# Patient Record
Sex: FEMALE | Race: WHITE | Employment: FULL TIME | URBAN - METROPOLITAN AREA
[De-identification: names, ages, dates, MRNs, and addresses within clinical notes are randomized per-mention and may not be internally consistent; named-entity substitution may affect disease eponyms.]

---

## 2023-05-09 LAB
C. TRACHOMATIS, EXTERNAL RESULT: NEGATIVE
N. GONORRHOEAE, EXTERNAL RESULT: NEGATIVE

## 2023-05-25 LAB — RH FACTOR, EXTERNAL RESULT: NORMAL

## 2023-05-26 LAB
ABO, EXTERNAL RESULT: NORMAL
HEP B, EXTERNAL RESULT: NEGATIVE
HEPATITIS C ANTIBODY, EXTERNAL RESULT: NEGATIVE
HIV, EXTERNAL RESULT: NEGATIVE
RH FACTOR, EXTERNAL RESULT: NEGATIVE
RPR, EXTERNAL RESULT: NON REACTIVE
RUBELLA TITER, EXTERNAL RESULT: NORMAL

## 2023-12-12 LAB — GBS, EXTERNAL RESULT: NEGATIVE

## 2024-01-02 ENCOUNTER — HOSPITAL ENCOUNTER (INPATIENT)
Facility: HOSPITAL | Age: 29
LOS: 3 days | Discharge: HOME | End: 2024-01-05
Attending: OBSTETRICS & GYNECOLOGY | Admitting: OBSTETRICS & GYNECOLOGY
Payer: COMMERCIAL

## 2024-01-02 PROBLEM — Z34.90 TERM PREGNANCY: Status: ACTIVE | Noted: 2024-01-02

## 2024-01-02 LAB
BASOPHILS # BLD: 0 K/UL (ref 0–0.1)
BASOPHILS NFR BLD: 0 % (ref 0–1)
DIFFERENTIAL METHOD BLD: ABNORMAL
EOSINOPHIL # BLD: 0.1 K/UL (ref 0–0.4)
EOSINOPHIL NFR BLD: 1 % (ref 0–7)
ERYTHROCYTE [DISTWIDTH] IN BLOOD BY AUTOMATED COUNT: 12.9 % (ref 11.5–14.5)
HCT VFR BLD AUTO: 33.2 % (ref 35–47)
HGB BLD-MCNC: 11.5 G/DL (ref 11.5–16)
IMM GRANULOCYTES # BLD AUTO: 0 K/UL (ref 0–0.04)
IMM GRANULOCYTES NFR BLD AUTO: 0 % (ref 0–0.5)
LYMPHOCYTES # BLD: 1.4 K/UL (ref 0.8–3.5)
LYMPHOCYTES NFR BLD: 14 % (ref 12–49)
MCH RBC QN AUTO: 28.5 PG (ref 26–34)
MCHC RBC AUTO-ENTMCNC: 34.6 G/DL (ref 30–36.5)
MCV RBC AUTO: 82.4 FL (ref 80–99)
MONOCYTES # BLD: 0.6 K/UL (ref 0–1)
MONOCYTES NFR BLD: 6 % (ref 5–13)
NEUTS SEG # BLD: 8 K/UL (ref 1.8–8)
NEUTS SEG NFR BLD: 79 % (ref 32–75)
NRBC # BLD: 0 K/UL (ref 0–0.01)
NRBC BLD-RTO: 0 PER 100 WBC
PLATELET # BLD AUTO: 365 K/UL (ref 150–400)
PMV BLD AUTO: 10.8 FL (ref 8.9–12.9)
RBC # BLD AUTO: 4.03 M/UL (ref 3.8–5.2)
WBC # BLD AUTO: 10.1 K/UL (ref 3.6–11)

## 2024-01-02 PROCEDURE — 1100000000 HC RM PRIVATE

## 2024-01-02 PROCEDURE — 86901 BLOOD TYPING SEROLOGIC RH(D): CPT

## 2024-01-02 PROCEDURE — 86850 RBC ANTIBODY SCREEN: CPT

## 2024-01-02 PROCEDURE — 85025 COMPLETE CBC W/AUTO DIFF WBC: CPT

## 2024-01-02 PROCEDURE — 7210000100 HC LABOR FEE PER 1 HR: Performed by: OBSTETRICS & GYNECOLOGY

## 2024-01-02 PROCEDURE — 86900 BLOOD TYPING SEROLOGIC ABO: CPT

## 2024-01-02 PROCEDURE — 36415 COLL VENOUS BLD VENIPUNCTURE: CPT

## 2024-01-02 PROCEDURE — 86780 TREPONEMA PALLIDUM: CPT

## 2024-01-02 RX ORDER — MISOPROSTOL 200 UG/1
800 TABLET ORAL PRN
Status: DISCONTINUED | OUTPATIENT
Start: 2024-01-02 | End: 2024-01-03

## 2024-01-02 RX ORDER — DOCUSATE SODIUM 100 MG/1
100 CAPSULE, LIQUID FILLED ORAL 2 TIMES DAILY
Status: DISCONTINUED | OUTPATIENT
Start: 2024-01-02 | End: 2024-01-03

## 2024-01-02 RX ORDER — TRANEXAMIC ACID 10 MG/ML
1000 INJECTION, SOLUTION INTRAVENOUS
Status: DISCONTINUED | OUTPATIENT
Start: 2024-01-02 | End: 2024-01-03

## 2024-01-02 RX ORDER — SODIUM CHLORIDE 0.9 % (FLUSH) 0.9 %
5-40 SYRINGE (ML) INJECTION PRN
Status: DISCONTINUED | OUTPATIENT
Start: 2024-01-02 | End: 2024-01-03

## 2024-01-02 RX ORDER — CARBOPROST TROMETHAMINE 250 UG/ML
250 INJECTION, SOLUTION INTRAMUSCULAR PRN
Status: DISCONTINUED | OUTPATIENT
Start: 2024-01-02 | End: 2024-01-03

## 2024-01-02 RX ORDER — METHYLERGONOVINE MALEATE 0.2 MG/ML
200 INJECTION INTRAVENOUS PRN
Status: DISCONTINUED | OUTPATIENT
Start: 2024-01-02 | End: 2024-01-03

## 2024-01-02 RX ORDER — SODIUM CHLORIDE, SODIUM LACTATE, POTASSIUM CHLORIDE, AND CALCIUM CHLORIDE .6; .31; .03; .02 G/100ML; G/100ML; G/100ML; G/100ML
500 INJECTION, SOLUTION INTRAVENOUS PRN
Status: DISCONTINUED | OUTPATIENT
Start: 2024-01-02 | End: 2024-01-03

## 2024-01-02 RX ORDER — SODIUM CHLORIDE, SODIUM LACTATE, POTASSIUM CHLORIDE, AND CALCIUM CHLORIDE .6; .31; .03; .02 G/100ML; G/100ML; G/100ML; G/100ML
1000 INJECTION, SOLUTION INTRAVENOUS PRN
Status: DISCONTINUED | OUTPATIENT
Start: 2024-01-02 | End: 2024-01-03

## 2024-01-02 RX ORDER — SODIUM CHLORIDE 0.9 % (FLUSH) 0.9 %
5-40 SYRINGE (ML) INJECTION EVERY 12 HOURS SCHEDULED
Status: DISCONTINUED | OUTPATIENT
Start: 2024-01-02 | End: 2024-01-03

## 2024-01-02 RX ORDER — SODIUM CHLORIDE 9 MG/ML
25 INJECTION, SOLUTION INTRAVENOUS PRN
Status: DISCONTINUED | OUTPATIENT
Start: 2024-01-02 | End: 2024-01-03

## 2024-01-02 RX ORDER — ACETAMINOPHEN 325 MG/1
650 TABLET ORAL EVERY 4 HOURS PRN
Status: DISCONTINUED | OUTPATIENT
Start: 2024-01-02 | End: 2024-01-03

## 2024-01-02 RX ORDER — SODIUM CHLORIDE, SODIUM LACTATE, POTASSIUM CHLORIDE, CALCIUM CHLORIDE 600; 310; 30; 20 MG/100ML; MG/100ML; MG/100ML; MG/100ML
INJECTION, SOLUTION INTRAVENOUS CONTINUOUS
Status: DISCONTINUED | OUTPATIENT
Start: 2024-01-02 | End: 2024-01-03

## 2024-01-03 ENCOUNTER — ANESTHESIA EVENT (OUTPATIENT)
Facility: HOSPITAL | Age: 29
End: 2024-01-03
Payer: COMMERCIAL

## 2024-01-03 ENCOUNTER — ANESTHESIA (OUTPATIENT)
Facility: HOSPITAL | Age: 29
End: 2024-01-03
Payer: COMMERCIAL

## 2024-01-03 PROBLEM — Z3A.39 39 WEEKS GESTATION OF PREGNANCY: Status: ACTIVE | Noted: 2024-01-03

## 2024-01-03 PROBLEM — Z37.9 NORMAL LABOR: Status: ACTIVE | Noted: 2024-01-03

## 2024-01-03 PROBLEM — Z34.90 TERM PREGNANCY: Status: RESOLVED | Noted: 2024-01-02 | Resolved: 2024-01-03

## 2024-01-03 PROBLEM — L70.9 ACNE: Status: RESOLVED | Noted: 2018-08-13 | Resolved: 2024-01-03

## 2024-01-03 LAB
ABO + RH BLD: NORMAL
BLOOD GROUP ANTIBODIES SERPL: NORMAL
SPECIMEN EXP DATE BLD: NORMAL
WEAK D AG RBC QL: NORMAL

## 2024-01-03 PROCEDURE — 51701 INSERT BLADDER CATHETER: CPT

## 2024-01-03 PROCEDURE — 2580000003 HC RX 258

## 2024-01-03 PROCEDURE — 6360000002 HC RX W HCPCS

## 2024-01-03 PROCEDURE — 1120000000 HC RM PRIVATE OB

## 2024-01-03 PROCEDURE — 7210000100 HC LABOR FEE PER 1 HR: Performed by: OBSTETRICS & GYNECOLOGY

## 2024-01-03 PROCEDURE — 51702 INSERT TEMP BLADDER CATH: CPT

## 2024-01-03 PROCEDURE — 6370000000 HC RX 637 (ALT 250 FOR IP): Performed by: OBSTETRICS & GYNECOLOGY

## 2024-01-03 PROCEDURE — 2500000003 HC RX 250 WO HCPCS: Performed by: NURSE ANESTHETIST, CERTIFIED REGISTERED

## 2024-01-03 PROCEDURE — 2580000003 HC RX 258: Performed by: OBSTETRICS & GYNECOLOGY

## 2024-01-03 PROCEDURE — 7220000101 HC DELIVERY VAGINAL/SINGLE: Performed by: OBSTETRICS & GYNECOLOGY

## 2024-01-03 PROCEDURE — 6360000002 HC RX W HCPCS: Performed by: NURSE ANESTHETIST, CERTIFIED REGISTERED

## 2024-01-03 PROCEDURE — 0KQM0ZZ REPAIR PERINEUM MUSCLE, OPEN APPROACH: ICD-10-PCS | Performed by: OBSTETRICS & GYNECOLOGY

## 2024-01-03 PROCEDURE — 3700000025 EPIDURAL BLOCK: Performed by: ANESTHESIOLOGY

## 2024-01-03 PROCEDURE — 3700000156 HC EPIDURAL ANESTHESIA: Performed by: NURSE ANESTHETIST, CERTIFIED REGISTERED

## 2024-01-03 PROCEDURE — 00HU33Z INSERTION OF INFUSION DEVICE INTO SPINAL CANAL, PERCUTANEOUS APPROACH: ICD-10-PCS | Performed by: ANESTHESIOLOGY

## 2024-01-03 RX ORDER — LANOLIN ALCOHOL/MO/W.PET/CERES
3 CREAM (GRAM) TOPICAL NIGHTLY PRN
Status: DISCONTINUED | OUTPATIENT
Start: 2024-01-03 | End: 2024-01-05 | Stop reason: HOSPADM

## 2024-01-03 RX ORDER — BUPIVACAINE HYDROCHLORIDE 2.5 MG/ML
INJECTION, SOLUTION EPIDURAL; INFILTRATION; INTRACAUDAL PRN
Status: DISCONTINUED | OUTPATIENT
Start: 2024-01-03 | End: 2024-01-03 | Stop reason: SDUPTHER

## 2024-01-03 RX ORDER — DOCUSATE SODIUM 100 MG/1
100 CAPSULE, LIQUID FILLED ORAL 2 TIMES DAILY
Status: DISCONTINUED | OUTPATIENT
Start: 2024-01-03 | End: 2024-01-05 | Stop reason: HOSPADM

## 2024-01-03 RX ORDER — FENTANYL 0.2 MG/100ML-BUPIV 0.125%-NACL 0.9% EPIDURAL INJ 2/0.125 MCG/ML-%
10 SOLUTION INJECTION CONTINUOUS
Status: DISCONTINUED | OUTPATIENT
Start: 2024-01-03 | End: 2024-01-03

## 2024-01-03 RX ORDER — NALOXONE HYDROCHLORIDE 0.4 MG/ML
INJECTION, SOLUTION INTRAMUSCULAR; INTRAVENOUS; SUBCUTANEOUS PRN
Status: DISCONTINUED | OUTPATIENT
Start: 2024-01-03 | End: 2024-01-03

## 2024-01-03 RX ORDER — ACETAMINOPHEN 500 MG
1000 TABLET ORAL EVERY 8 HOURS SCHEDULED
Status: DISCONTINUED | OUTPATIENT
Start: 2024-01-03 | End: 2024-01-05 | Stop reason: HOSPADM

## 2024-01-03 RX ORDER — OXYCODONE HYDROCHLORIDE 5 MG/1
5 TABLET ORAL EVERY 4 HOURS PRN
Status: DISCONTINUED | OUTPATIENT
Start: 2024-01-03 | End: 2024-01-05 | Stop reason: HOSPADM

## 2024-01-03 RX ORDER — SODIUM CHLORIDE 0.9 % (FLUSH) 0.9 %
5-40 SYRINGE (ML) INJECTION EVERY 12 HOURS SCHEDULED
Status: DISCONTINUED | OUTPATIENT
Start: 2024-01-03 | End: 2024-01-04

## 2024-01-03 RX ORDER — LIDOCAINE HYDROCHLORIDE AND EPINEPHRINE 15; 5 MG/ML; UG/ML
INJECTION, SOLUTION EPIDURAL PRN
Status: DISCONTINUED | OUTPATIENT
Start: 2024-01-03 | End: 2024-01-03 | Stop reason: SDUPTHER

## 2024-01-03 RX ORDER — KETOROLAC TROMETHAMINE 30 MG/ML
30 INJECTION, SOLUTION INTRAMUSCULAR; INTRAVENOUS EVERY 6 HOURS PRN
Status: DISCONTINUED | OUTPATIENT
Start: 2024-01-03 | End: 2024-01-05 | Stop reason: HOSPADM

## 2024-01-03 RX ORDER — ONDANSETRON 2 MG/ML
4 INJECTION INTRAMUSCULAR; INTRAVENOUS EVERY 6 HOURS PRN
Status: DISCONTINUED | OUTPATIENT
Start: 2024-01-03 | End: 2024-01-03 | Stop reason: SDUPTHER

## 2024-01-03 RX ORDER — EPHEDRINE SULFATE/0.9% NACL/PF 50 MG/5 ML
10 SYRINGE (ML) INTRAVENOUS ONCE
Status: DISCONTINUED | OUTPATIENT
Start: 2024-01-03 | End: 2024-01-03

## 2024-01-03 RX ORDER — FERROUS SULFATE 325(65) MG
325 TABLET ORAL EVERY OTHER DAY
Status: DISCONTINUED | OUTPATIENT
Start: 2024-01-03 | End: 2024-01-05 | Stop reason: HOSPADM

## 2024-01-03 RX ORDER — LANOLIN/MINERAL OIL
LOTION (ML) TOPICAL PRN
Status: DISCONTINUED | OUTPATIENT
Start: 2024-01-03 | End: 2024-01-05 | Stop reason: HOSPADM

## 2024-01-03 RX ORDER — SODIUM CHLORIDE 9 MG/ML
INJECTION, SOLUTION INTRAVENOUS PRN
Status: DISCONTINUED | OUTPATIENT
Start: 2024-01-03 | End: 2024-01-04

## 2024-01-03 RX ORDER — SWAB
1 SWAB, NON-MEDICATED MISCELLANEOUS DAILY
Status: DISCONTINUED | OUTPATIENT
Start: 2024-01-03 | End: 2024-01-05 | Stop reason: HOSPADM

## 2024-01-03 RX ORDER — SODIUM CHLORIDE 0.9 % (FLUSH) 0.9 %
5-40 SYRINGE (ML) INJECTION PRN
Status: DISCONTINUED | OUTPATIENT
Start: 2024-01-03 | End: 2024-01-04

## 2024-01-03 RX ORDER — ONDANSETRON 2 MG/ML
4 INJECTION INTRAMUSCULAR; INTRAVENOUS EVERY 6 HOURS PRN
Status: DISCONTINUED | OUTPATIENT
Start: 2024-01-03 | End: 2024-01-03

## 2024-01-03 RX ORDER — IBUPROFEN 800 MG/1
800 TABLET ORAL EVERY 8 HOURS PRN
Status: DISCONTINUED | OUTPATIENT
Start: 2024-01-03 | End: 2024-01-05 | Stop reason: HOSPADM

## 2024-01-03 RX ADMIN — FERROUS SULFATE TAB 325 MG (65 MG ELEMENTAL FE) 325 MG: 325 (65 FE) TAB at 08:56

## 2024-01-03 RX ADMIN — LIDOCAINE HYDROCHLORIDE AND EPINEPHRINE 2 ML: 15; 5 INJECTION, SOLUTION EPIDURAL at 01:50

## 2024-01-03 RX ADMIN — IBUPROFEN 800 MG: 800 TABLET, FILM COATED ORAL at 08:56

## 2024-01-03 RX ADMIN — SODIUM CHLORIDE, POTASSIUM CHLORIDE, SODIUM LACTATE AND CALCIUM CHLORIDE 1000 ML: 600; 310; 30; 20 INJECTION, SOLUTION INTRAVENOUS at 00:24

## 2024-01-03 RX ADMIN — IBUPROFEN 800 MG: 800 TABLET, FILM COATED ORAL at 16:47

## 2024-01-03 RX ADMIN — IBUPROFEN 800 MG: 800 TABLET, FILM COATED ORAL at 23:39

## 2024-01-03 RX ADMIN — DOCUSATE SODIUM 100 MG: 100 CAPSULE, LIQUID FILLED ORAL at 21:08

## 2024-01-03 RX ADMIN — DOCUSATE SODIUM 100 MG: 100 CAPSULE, LIQUID FILLED ORAL at 08:56

## 2024-01-03 RX ADMIN — OXYTOCIN 30 UNITS: 10 INJECTION, SOLUTION INTRAMUSCULAR; INTRAVENOUS at 07:04

## 2024-01-03 RX ADMIN — LIDOCAINE HYDROCHLORIDE AND EPINEPHRINE 3 ML: 15; 5 INJECTION, SOLUTION EPIDURAL at 01:46

## 2024-01-03 RX ADMIN — Medication 10 ML/HR: at 02:09

## 2024-01-03 RX ADMIN — BUPIVACAINE HYDROCHLORIDE 6 ML: 2.5 INJECTION, SOLUTION EPIDURAL; INFILTRATION; INTRACAUDAL; PERINEURAL at 01:50

## 2024-01-03 RX ADMIN — Medication 30 UNITS: at 07:04

## 2024-01-03 ASSESSMENT — PAIN DESCRIPTION - LOCATION: LOCATION: ABDOMEN

## 2024-01-03 ASSESSMENT — PAIN DESCRIPTION - DESCRIPTORS: DESCRIPTORS: CRAMPING

## 2024-01-03 ASSESSMENT — PAIN SCALES - GENERAL: PAINLEVEL_OUTOF10: 1

## 2024-01-03 ASSESSMENT — PAIN DESCRIPTION - ORIENTATION: ORIENTATION: ANTERIOR;LOWER

## 2024-01-03 NOTE — ANESTHESIA PRE PROCEDURE
Department of Anesthesiology  Preprocedure Note       Name:  Iza Villegas   Age:  28 y.o.  :  1995                                          MRN:  [id number]         Date:  1/3/2024      Surgeon: * No surgeons listed *    Procedure: * No procedures listed *    Medications prior to admission:   Prior to Admission medications    Medication Sig Start Date End Date Taking? Authorizing Provider   folic acid-pyridoxine-cyanocobalamine (FOLTX) 2.5-25-1 MG TABS tablet Take 1 tablet by mouth daily   Yes Provider, MD Nayely   Norgestim-Eth Estrad Triphasic 0.18/0.215/0.25 MG-35 MCG TABS Take 1 tablet by mouth daily  Patient not taking: Reported on 2024 10/13/20   Automatic Reconciliation, Ar   sertraline (ZOLOFT) 25 MG tablet ceived the following from [facility]: Outside name: sertraline (ZOLOFT) 25 mg tablet  Patient not taking: Reported on 2024   Automatic Reconciliation, Ar       Current medications:    Current Facility-Administered Medications   Medication Dose Route Frequency Provider Last Rate Last Admin    fentaNYL 2 mcg/mL BUPivacaine 0.125% in sodium chloride 0.9% 100 mL epidural infusion  10 mL/hr Epidural Continuous Laura Xiong APRN - CRNA        naloxone 0.4 mg in 10 mL sodium chloride syringe   IntraVENous PRN Laura Xiong APRN - SARWAT        ondansetron (ZOFRAN) injection 4 mg  4 mg IntraVENous Q6H PRN Laura Xiong APRN - CRNA        ePHEDrine injection 10 mg  10 mg IntraVENous Once Laura Xiong APRN - CRNA        lactated ringers IV soln infusion   IntraVENous Continuous Daniel Iverson MD        lactated ringers bolus bolus 500 mL  500 mL IntraVENous PRN Daniel Iverson MD        Or    lactated ringers bolus bolus 1,000 mL  1,000 mL IntraVENous PRN Daniel Iverson  mL/hr at 24 0024 1,000 mL at 24 0024    sodium chloride flush 0.9 % injection 5-40 mL  5-40  mL IntraVENous 2 times per day Daniel Iverson MD        sodium chloride flush 0.9 % injection 5-40 mL  5-40 mL IntraVENous PRN Daniel Iverson MD        0.9 % sodium chloride infusion  25 mL IntraVENous PRN Daniel Iverson MD        methylergonovine (METHERGINE) injection 200 mcg  200 mcg IntraMUSCular PRN Daniel Iverson MD        carboprost (HEMABATE) injection 250 mcg  250 mcg IntraMUSCular PRN Daniel Iverson MD        miSOPROStol (CYTOTEC) tablet 800 mcg  800 mcg Rectal PRN Daniel Iverson MD        tranexamic acid-NaCl IVPB premix 1,000 mg  1,000 mg IntraVENous Once PRN Daniel Iverson MD        acetaminophen (TYLENOL) tablet 650 mg  650 mg Oral Q4H PRN Daniel Iverson MD        benzocaine-menthol (DERMOPLAST) 20-0.5 % spray   Topical PRN Daniel Iverson MD        docusate sodium (COLACE) capsule 100 mg  100 mg Oral BID Daniel Iverson MD         Facility-Administered Medications Ordered in Other Encounters   Medication Dose Route Frequency Provider Last Rate Last Admin    Lidocaine-EPINEPHrine 1.5 %-1:[id number]   Epidural PRN Laura Xiong APRN - CRNA   2 mL at 01/03/24 0150    BUPivacaine (PF) (MARCAINE) 0.25 % injection   Epidural PRN Laura Xiong APRN - CRNA   6 mL at 01/03/24 0150       Allergies:  No Known Allergies    Problem List:    Patient Active Problem List   Diagnosis Code    39 weeks gestation of pregnancy Z3A.39    Normal labor O80, Z37.9       Past Medical History:        Diagnosis Date    Depression     Pap smear for cervical cancer screening 08/14/2019    Negative    Seasonal allergic rhinitis        Past Surgical History:        Procedure Laterality Date    WISDOM TOOTH EXTRACTION  2020       Social History:    Social History     Tobacco Use    Smoking status: Never    Smokeless tobacco: Never   Substance Use Topics    Alcohol use: Yes                                Counseling given: Not

## 2024-01-03 NOTE — DISCHARGE SUMMARY
Patient ID:  Iza Villegas  [id number]  28 y.o.  1995    Admit Date: 2024    Discharge Date: 2024     Admitting Physician: Daniel Iverson MD  Attending Physician: Daniel Iverson MD    Admission Diagnoses:   Pregnant 39 weeks [Z3A.4.39]  2.   Normal labor [O80]    Procedures for this admission: ; Epidural; Repair of 2nd degree laceration    Hospital Course: Uncomplicated L&D and MIU stay    Discharge Diagnoses: Same as above with  producing a viable infant.  Information for the patient's :  Guanaco Villegas [[id number]]          Discharge Disposition:  home    Discharge Condition:  Good    Additional Diagnoses:  None .     Maternal Labs: No components found for: \"OBEXTABORH\", \"OBEXTABSCRN\", \"OBEXTHBSAG\", \"OBEXTHIV\", \"OBEXTRUBELLA\", \"OBEXTRPR\", \"OBEXTGRBS\"    Cord Blood Results:   Information for the patient's :  Guanaco Villegas [[id number]]   No results found for: \"ABORH\", \"PCTDIG\", \"BILI\"         History of Present Illness:   OB History          2    Para        Term   0       0    AB   1    Living   0         SAB   1    IAB        Ectopic        Molar        Multiple        Live Births                  Admitted for active labor.     Hospital Course:   Patient was admitted as above and delivered via .  Please the chart for details.  The postpartum course was unremarkable.  She was deemed stable for discharge home on day 2.    Follow up with Dr. Daniel Iverson MD in 6 weeks        Signed:  Daniel Iverson MD  1/3/2024  7:39 AM

## 2024-01-03 NOTE — PROGRESS NOTES
2230: Pt arrived ambulatory to unit with c/o contractions every 2-4 minutes since 10 am. Pt denies HA, blurred vision, NV, epigastric pain, or loss of vaginal fluids. Pt was 1 cm in office.    2236: SVE done at this time (3/50/-2).    2244: Zayra WINKLER informed about pt. Pt to be admitted.    0024: Fluid bolus started for epidural.    0051: MD at bedside. SVE done at this time (6/100/-1).    0137: Time out for epidural.    0146: Test dose.    0151: Loading dose given.    0220: RN at bedside due to prolonged deceleration. Pt turned first on right side, then on left side in trendelenburg with O2 and fluid bolus.    0225: MD at bedside.    0229: SVE done at this time (8/100/0).    0600: Patient actively pushing.  RN remains in continuous attendance at the bedside.  Assessment & evaluation of fetal heart rate ongoing via continuous EFM.    0653: RN remained at bedside throughout pushing.  EFM continuously assessed.  Vaginal delivery of viable infant.    0700: Bedside and Verbal shift change report given to HOLLIE Adhikari RN (oncoming nurse) by DIANA Dumont RN (offgoing nurse). Report included the following information Nurse Handoff Report, Index, Intake/Output, MAR, and Recent Results.

## 2024-01-03 NOTE — H&P
History & Physical    Name: Iza Villegas MRN: [id number]  SSN: [id number]    YOB: 1995  Age: 28 y.o.  Sex: female        Subjective:     Estimated Date of Delivery: 24  OB History          2    Para        Term   0       0    AB   1    Living   0         SAB   1    IAB        Ectopic        Molar        Multiple        Live Births                    Ms. Villegas is admitted with pregnancy at 39w3d for active labor. Prenatal course was normal. Please see prenatal records for details.    Past Medical History:   Diagnosis Date    Depression     Pap smear for cervical cancer screening 2019    Negative    Seasonal allergic rhinitis      Past Surgical History:   Procedure Laterality Date    WISDOM TOOTH EXTRACTION       Social History     Occupational History    Not on file   Tobacco Use    Smoking status: Never    Smokeless tobacco: Never   Substance and Sexual Activity    Alcohol use: Yes    Drug use: No    Sexual activity: Not Currently     Birth control/protection: Pill     No family history on file.    No Known Allergies  Prior to Admission medications    Medication Sig Start Date End Date Taking? Authorizing Provider   folic acid-pyridoxine-cyanocobalamine (FOLTX) 2.5-25-1 MG TABS tablet Take 1 tablet by mouth daily   Yes Provider, Historical, MD   Norgestim-Eth Estrad Triphasic 0.18/0.215/0.25 MG-35 MCG TABS Take 1 tablet by mouth daily  Patient not taking: Reported on 2024 10/13/20   Automatic Reconciliation, Ar   sertraline (ZOLOFT) 25 MG tablet ceived the following from [facility]: Outside name: sertraline (ZOLOFT) 25 mg tablet  Patient not taking: Reported on 2024   Automatic Reconciliation, Ar        Review of Systems: Pertinent items are noted in HPI.    Objective:     Vitals:  Vitals:    24 2229   BP: 133/86   Pulse: 84   Resp: 16   Temp: 98.6 °F (37 °C)   TempSrc: Oral   SpO2: 96%        Physical Exam:  Patient without

## 2024-01-03 NOTE — PROGRESS NOTES
Labor Progress Note    Called to the bedside for a 6 minute bradycardia secondary to tachysystole (4 Ctxs in 6 mins) which self regulated with position changes, O2 administration, and IVF bolus    Patient seen, fetal heart rate and contraction pattern evaluated, patient examined.  Comfortable after her epidural.    Physical Exam:  Cervical Exam:  8 cm dilated    100% effaced    0 station    Presenting Part: cephalic  Uterine Activity: Frequency: Every 3 minutes  Fetal Heart Rate: Baseline: 130 per minute  Variability: moderate  Accelerations: no  Decelerations: none    Assessment/Plan:  Reassuring fetal status, Continue plan for vaginal delivery  Active labor  Cat 1 FHT after resolution of tachysystole    Daniel Iverson MD

## 2024-01-03 NOTE — L&D DELIVERY NOTE
Guanaco Villegas [[id number]]      Labor Events     Labor: No   Steroids: None  Cervical Ripening Date/Time:      Antibiotics Received during Labor: No  Rupture Identifier: Sac 1  Rupture Date/Time:  1/3/24 02:30:00   Rupture Type: AROM, Intact  Fluid Color: Clear  Fluid Odor: None  Induction: None  Augmentation: AROM  Labor Complications: None              Anesthesia    Method: Epidural       Labor Event Times      Labor onset date/time:        Dilation complete date/time:  1/3/24 05:14:00     Start pushing date/time:  1/3/2024 06:00:00   Decision date/time (emergent ):            Labor Length    2nd stage: 1h 39m  3rd stage: 0h 05m       Delivery Details      Delivery Date: 1/3/24 Delivery Time: 06:53:00   Delivery Type: Vaginal, Spontaneous              Marathon Presentation    Presentation: Vertex  Position: Left  _: Occiput  _: Anterior       Shoulder Dystocia    Shoulder Dystocia Present?: No       Assisted Delivery Details    Forceps Attempted?: No  Vacuum Extractor Attempted?: No                           Cord    Vessels: 3 Vessels  Complications: Nuchal Loose  Delayed Cord Clamping?: Yes  Cord Clamped Date/Time: 1/3/2024 06:55:55  Cord Blood Disposition: Lab  Gases Sent?: No              Placenta    Date/Time: 1/3/2024 06:58:00  Removal: Expressed  Appearance: Intact  Disposition: Discarded       Lacerations    Episiotomy: None  Perineal Lacerations: 2nd  Other Lacerations: no non-perineal laceration  Number of Repair Packets: 1       Vaginal Counts    Initial Count Personnel: DIANA YUEN RN  Initial Count Verified By: DIANA MORIN MD  Intial Sponge Count: Correct Intial Needles Count: Correct Intial Instruments Count: Correct   Final Sponges Count: Correct Final Needles  Count: Correct Final Instruments Count: Correct   Final Count Personnel: HOLLIE SELLERS RN  Final Count Verified By: DIANA MORIN MD  Accurate Final Count?: Yes       Blood Loss  Mother: Iza Villegas #[id number]     Start   Mother's Information      Delivery Blood Loss  24 1853 - 24 1151      Quantitative Blood Loss (mL) Hospital Encounter 248 grams    Total  248 mL               End of Mother's Information  Mother: Iza Villegas #[id number]                Delivery Providers    Delivering clinician: Daniel Iverson MD     Provider Role    Daniel Iverson MD Obstetrician    Eliana Dumont, RN Primary Nurse    Suhail Estrada, RN Primary  Nurse    Esslinger, Anusha K, RN Nursery Nurse              Roulette Assessment    Living Status: Living  Delivery Location Comment: 203        Skin Color:   Heart Rate:   Reflex Irritability:   Muscle Tone:   Respiratory Effort:   Total:            1 Minute:    1    2    2    2    2    9         5 Minute:    1    2    2    2    2    9                                        Apgars Assigned By: KAREN CLAY RN              Resuscitation    Method: Bulb Suction, Stimulation              Measurements      Birth Weight: 2970 g   Birth Length: 49.5 cm     Head Circumference: 32 cm     Chest Circumference: 31 cm     Abdominal Girth: 32 cm              Skin to Skin      Skin to Skin Initiation Date/Time: 1/3/24 06:53:00 EST     Skin to Skin With: Mother

## 2024-01-03 NOTE — ANESTHESIA PROCEDURE NOTES
Epidural Block    Patient location during procedure: OB  Start time: 1/3/2024 1:37 AM  End time: 1/3/2024 1:51 AM  Reason for block: labor epidural  Staffing  Performed: resident/CRNA   Anesthesiologist: Jeff Real MD  Resident/CRNA: Laura Xiong APRN - CRNA  Performed by: Laura Xiong APRN - CRNA  Authorized by: Jeff Real MD    Epidural  Patient position: sitting  Prep: ChloraPrep  Patient monitoring: continuous pulse ox and frequent blood pressure checks  Approach: midline  Location: L3-4  Injection technique: CINDY saline  Provider prep: sterile gloves and mask  Needle  Needle type: Tuohy   Needle gauge: 17 G  Needle length: 3.5 in  Needle insertion depth: 5 cm  Catheter type: multi-orifice  Catheter size: 20 G  Catheter at skin depth: 10 cm  Test dose: negativeCatheter Secured: tegaderm and tape  Assessment  Hemodynamics: stable  Attempts: 1  Outcomes: uncomplicated and patient tolerated procedure well  Preanesthetic Checklist  Completed: patient identified, IV checked, site marked, risks and benefits discussed, surgical/procedural consents, equipment checked, pre-op evaluation, timeout performed, anesthesia consent given, oxygen available, monitors applied/VS acknowledged, fire risk safety assessment completed and verbalized and blood product R/B/A discussed and consented

## 2024-01-03 NOTE — LACTATION NOTE
This note was copied from a baby's chart.  Discussed with mother her plan for feeding.  Reviewed the benefits of exclusive breast milk feeding during the hospital stay.   Informed her of the risks of using formula to supplement in the first few days of life as well as the benefits of successful breast milk feeding; referred her to the Breastfeeding booklet about this information.   She acknowledges understanding of information reviewed and states that it is her plan to breastfeed her infant.  Will support her choice and offer additional information as needed.       Encouraged mom to attempt feeding with baby led feeding cues. Just as sucking on fingers, rooting, mouthing.   Looking for 8-12 feedings in 24 hours.   Don't limit baby at breast, allow baby to come of breast on it's own. Baby may want to feed  often and may increase number of feedings on second day of life. Skin to skin encouraged.      If baby doesn't nurse,  Mom should  hand express  10-20 drops of colostrum and drip into baby's mouth, or give to baby by finger feeding, cup feeding, or spoon feeding at least every 2-3 hours.     Mother will successfully establish breastfeeding by feeding in response to early feeding cues   or wake every 3h, will obtain deep latch, and will keep log of feedings/output.  Taught to BF at hunger cues and or q 2-3 hrs and to offer 10-20 drops of hand expressed colostrum at any non-feeds.      Left Breast: Soft  Left Nipple: Protrude  Right Nipple: Protrude  Right Breast: Soft  Position and Latch: Independently, Good technique  Signs of Transfer: Audible infant swallows, Nutritive sucking  Maternal Response: Attentive, Comfortable           Latch: Repeated attempts, hold nipple in mouth, stimulate to suck  Audible Swallowing: A few with stimulation  Type of Nipple: Everted (after stimulation)  Comfort (Breast/Nipple): Soft/non-tender  Hold (Positioning): Full assist, teach one side, mother does other, staff holds  LATCH

## 2024-01-03 NOTE — PROGRESS NOTES
2:12 PM Patient's prenatal labs display B blood type with Weak D antibodies. Per Paul (blood bank), [facility] views Weak D antibodies as positive blood types and do not recommend Rhogam. Labs reviewed with patient and Dr. Iverson, and Iza verbalizes understanding.

## 2024-01-03 NOTE — PROGRESS NOTES
0720: Dr. Iverson leaving pt bedside at this time after delivery and repair    0809: This RN notifying Dr. Iverson of pt bp readings for last hour. Md verbalizing understanding and TORB this RN to retake bp approx 30 min from last reading of 157/87 after pt visitors leave and allow pt to rest. This RN to call MD back if bp is 140's/90's or higher.

## 2024-01-04 LAB — T PALLIDUM AB SER QL IA: NON REACTIVE

## 2024-01-04 PROCEDURE — 1120000000 HC RM PRIVATE OB

## 2024-01-04 PROCEDURE — 94761 N-INVAS EAR/PLS OXIMETRY MLT: CPT

## 2024-01-04 PROCEDURE — 6370000000 HC RX 637 (ALT 250 FOR IP): Performed by: OBSTETRICS & GYNECOLOGY

## 2024-01-04 RX ADMIN — IBUPROFEN 800 MG: 800 TABLET, FILM COATED ORAL at 17:57

## 2024-01-04 RX ADMIN — IBUPROFEN 800 MG: 800 TABLET, FILM COATED ORAL at 08:42

## 2024-01-04 RX ADMIN — DOCUSATE SODIUM 100 MG: 100 CAPSULE, LIQUID FILLED ORAL at 08:42

## 2024-01-04 ASSESSMENT — PAIN SCALES - GENERAL
PAINLEVEL_OUTOF10: 3
PAINLEVEL_OUTOF10: 1
PAINLEVEL_OUTOF10: 3

## 2024-01-04 ASSESSMENT — PAIN DESCRIPTION - ORIENTATION
ORIENTATION: LOWER
ORIENTATION: LOWER

## 2024-01-04 ASSESSMENT — PAIN DESCRIPTION - LOCATION
LOCATION: ABDOMEN
LOCATION: ABDOMEN

## 2024-01-04 ASSESSMENT — PAIN DESCRIPTION - DESCRIPTORS
DESCRIPTORS: CRAMPING
DESCRIPTORS: CRAMPING

## 2024-01-04 NOTE — LACTATION NOTE
This note was copied from a baby's chart.  Mother states baby has been breastfeeding frequently. He last breast fed at 0930 for 20 minutes.     Current infant weight loss is -4.54% (WNL). Reviewed breastfeeding basics:  Supply and demand,  stomach size, early  Feeding cues, skin to skin, positioning and baby led latch-on, assymetrical latch with signs of good, deep latch vs shallow, feeding frequency and duration, and log sheet for tracking infant feedings and output.  Breastfeeding Booklet and Warm line information given.  Discussed typical  weight loss and the importance of infant weight checks with pediatrician 1-2 post discharge.     Care for sore/tender nipples discussed:  ways to improve positioning and latch practiced and discussed, hand express colostrum after feedings and let air dry, light application of lanolin, hydrogel pads, seek comfortable laid back feeding position, start feedings on least sore side first.    Mother will successfully establish breastfeeding by feeding in response to early feeding cues   or wake every 3h, will obtain deep latch, and will keep log of feedings/output.  Taught to BF at hunger cues and or q 2-3 hrs and to offer 10-20 drops of hand expressed colostrum at any non-feeds.      Left Breast: Soft  Left Nipple: Protrude  Right Nipple: Protrude  Right Breast: Soft  Position and Latch: Independently  Signs of Transfer: Audible infant swallows, Nutritive sucking  Maternal Response: Attentive, Comfortable            Breast Care: Nursing pads, Pumping supply provided, Lanolin provided     Lactation Comment: Baby last breast fed at 0930 for 20 minutes. Encouraged mother to call  for breastfeeding assistance.

## 2024-01-04 NOTE — PROGRESS NOTES
Post-Partum Day Number 1 Progress Note    Iza Villegas   28 y.o.      Information for the patient's :  Nelly, Baby Boy Iza [[id number]]   Vaginal, Spontaneous      Patient doing well without significant complaint.  Voiding without difficulty, normal lochia and ambulates in room. Pain well controlled. Nursing well. Desires Circ for the baby today.    Vitals:  /83   Pulse 58   Temp 97.7 °F (36.5 °C) (Oral)   Resp 16   SpO2 96%   Breastfeeding Unknown   Temp (24hrs), Av °F (36.7 °C), Min:97.7 °F (36.5 °C), Max:98.4 °F (36.9 °C)    Exam:   Patient without distress.                FF @ U-2 NT                LE NT w/o edema    Labs:     Lab Results   Component Value Date/Time    WBC 10.1 2024 11:07 PM    HGB 11.5 2024 11:07 PM    HCT 33.2 2024 11:07 PM     2024 11:07 PM       No results found for this or any previous visit (from the past 24 hour(s)).      Assessment: PPD 1 s/p , stable; B+/RI/XY \"Babatunde\"    Plan:  1. Continue routine postpartum care  2. Lactation assistance  3. B+ with weak D: No rhogam required per blood bank.      Daniel Iverson MD  2024

## 2024-01-05 VITALS
OXYGEN SATURATION: 100 % | SYSTOLIC BLOOD PRESSURE: 114 MMHG | DIASTOLIC BLOOD PRESSURE: 87 MMHG | HEART RATE: 54 BPM | RESPIRATION RATE: 14 BRPM | TEMPERATURE: 97.9 F

## 2024-01-05 PROBLEM — Z3A.39 39 WEEKS GESTATION OF PREGNANCY: Status: RESOLVED | Noted: 2024-01-03 | Resolved: 2024-01-05

## 2024-01-05 PROBLEM — Z37.9 NORMAL LABOR: Status: RESOLVED | Noted: 2024-01-03 | Resolved: 2024-01-05

## 2024-01-05 PROCEDURE — 6370000000 HC RX 637 (ALT 250 FOR IP): Performed by: OBSTETRICS & GYNECOLOGY

## 2024-01-05 RX ORDER — IBUPROFEN 800 MG/1
800 TABLET ORAL EVERY 8 HOURS PRN
Qty: 60 TABLET | Refills: 1 | Status: SHIPPED | OUTPATIENT
Start: 2024-01-05

## 2024-01-05 RX ADMIN — IBUPROFEN 800 MG: 800 TABLET, FILM COATED ORAL at 02:21

## 2024-01-05 RX ADMIN — DOCUSATE SODIUM 100 MG: 100 CAPSULE, LIQUID FILLED ORAL at 02:21

## 2024-01-05 NOTE — LACTATION NOTE
This note was copied from a baby's chart.  Mother and baby for discharge today. Mother states baby has been latching on and breastfeeding well last night and this morning.     Current infant weight loss is -6.16% (WNL). Reviewed breastfeeding basics:  Supply and demand,  stomach size, early  Feeding cues, skin to skin, positioning and baby led latch-on, assymetrical latch with signs of good, deep latch vs shallow, feeding frequency and duration, and log sheet for tracking infant feedings and output.  Breastfeeding Booklet and Warm line information given.  Discussed typical  weight loss and the importance of infant weight checks with pediatrician 1-2 post discharge.       Discussed eating a healthy diet. Instructed mother to eat a variety of foods in order to get a well balanced diet. She should consume an extra 500 calories per day (more than her non-pregnant requirement.) These extra calories will help provide energy needed for optimal breast milk production. Mother also encouraged to \"drink to thirst\" and it is recommended that she drink fluids such as water, fruit/vegetable juice. Nutritious snacks should be available so that she can eat throughout the day to help satisfy her hunger and maintain a good milk supply.       Discussed what to do if she gets engorged or if her nipples become sore:    Engorgement Care Guidelines:  Reviewed how milk is made and normal phases of milk production.  Taught care of engorged breasts - physiologic breastfeeding encouraged with use of cool packs (no ice directly on skin). Consider use of NSAIDS where appropriate for discomfort and inflammation. Can employ light touch, lymphatic drainage techniques on tender grandular tissues. Anticipatory guidance shared.      Care for sore/tender nipples discussed:  ways to improve positioning and latch practiced and discussed, hand express colostrum after feedings and let air dry, light application of lanolin, hydrogel pads,

## 2024-01-05 NOTE — PROGRESS NOTES
Post-Partum Day Number 2 Progress Note    Patient doing well post-partum without significant complaints.  Voiding without difficulty, normal lochia. Breast feeding well. Ready for discharge.    Vitals:  Patient Vitals for the past 24 hrs:   BP Temp Temp src Pulse Resp SpO2   24 0718 114/87 97.9 °F (36.6 °C) Oral 54 14 100 %   24 2238 130/87 98.4 °F (36.9 °C) -- 62 16 96 %   24 1510 115/82 97.3 °F (36.3 °C) Oral 70 16 100 %     Temp (24hrs), Av.9 °F (36.6 °C), Min:97.3 °F (36.3 °C), Max:98.4 °F (36.9 °C)      Vital signs stable, afebrile.    Exam:  Patient without distress.               Abdomen soft, fundus firm, nontender               Lower extremities, SHUN nontender w/o edema    Labs:   No results found for this or any previous visit (from the past 24 hour(s)).    Assessment and Plan:  PPD 2, stable, routine care; B+/RI/XY \"Babatunde\"   1. Discharge today-instructions reviewed.

## 2024-01-11 NOTE — ANESTHESIA POSTPROCEDURE EVALUATION
Department of Anesthesiology  Postprocedure Note    Patient: Iza Villegas  MRN: [id number]  YOB: 1995  Date of evaluation: 1/10/2024    Procedure Summary       Date: 01/03/24 Room / Location:     Anesthesia Start: 0137 Anesthesia Stop: 0653    Procedure: Labor Analgesia Diagnosis:     Scheduled Providers:  Responsible Provider: Jeff Real MD    Anesthesia Type: epidural ASA Status: 2            Anesthesia Type: No value filed.    Soha Phase I:      Soha Phase II:      Anesthesia Post Evaluation    Comments: No known complications    No notable events documented.

## 2025-06-30 LAB
CHOLEST SERPL-MCNC: 163 MG/DL
GLUCOSE SERPL-MCNC: 96 MG/DL (ref 65–100)
HDLC SERPL-MCNC: 85 MG/DL
LDLC SERPL CALC-MCNC: 68.2 MG/DL (ref 0–100)
TRIGL SERPL-MCNC: 49 MG/DL